# Patient Record
Sex: FEMALE
[De-identification: names, ages, dates, MRNs, and addresses within clinical notes are randomized per-mention and may not be internally consistent; named-entity substitution may affect disease eponyms.]

---

## 2018-07-20 ENCOUNTER — HOSPITAL ENCOUNTER (EMERGENCY)
Dept: HOSPITAL 88 - ER | Age: 33
Discharge: HOME | End: 2018-07-20
Payer: COMMERCIAL

## 2018-07-20 VITALS — WEIGHT: 293 LBS | BODY MASS INDEX: 47.09 KG/M2 | HEIGHT: 66 IN

## 2018-07-20 VITALS — DIASTOLIC BLOOD PRESSURE: 78 MMHG | SYSTOLIC BLOOD PRESSURE: 124 MMHG

## 2018-07-20 DIAGNOSIS — S23.3XXA: ICD-10-CM

## 2018-07-20 DIAGNOSIS — M62.830: ICD-10-CM

## 2018-07-20 DIAGNOSIS — S33.5XXA: ICD-10-CM

## 2018-07-20 DIAGNOSIS — M54.5: ICD-10-CM
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
## 2018-07-20 DIAGNOSIS — M54.2: Primary | ICD-10-CM

## 2018-07-20 DIAGNOSIS — M54.6: ICD-10-CM

## 2018-07-20 PROCEDURE — 99283 EMERGENCY DEPT VISIT LOW MDM: CPT

## 2018-07-20 PROCEDURE — 72072 X-RAY EXAM THORAC SPINE 3VWS: CPT

## 2018-07-20 PROCEDURE — 81025 URINE PREGNANCY TEST: CPT

## 2018-07-20 PROCEDURE — 72125 CT NECK SPINE W/O DYE: CPT

## 2018-07-20 PROCEDURE — 72100 X-RAY EXAM L-S SPINE 2/3 VWS: CPT
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
